# Patient Record
Sex: FEMALE | NOT HISPANIC OR LATINO | URBAN - METROPOLITAN AREA
[De-identification: names, ages, dates, MRNs, and addresses within clinical notes are randomized per-mention and may not be internally consistent; named-entity substitution may affect disease eponyms.]

---

## 2022-08-02 ENCOUNTER — TELEPHONE ENCOUNTER (OUTPATIENT)
Dept: URBAN - METROPOLITAN AREA CLINIC 70 | Facility: CLINIC | Age: 42
End: 2022-08-02

## 2022-08-03 ENCOUNTER — DASHBOARD ENCOUNTERS (OUTPATIENT)
Age: 42
End: 2022-08-03

## 2022-08-03 ENCOUNTER — OFFICE VISIT (OUTPATIENT)
Dept: URBAN - METROPOLITAN AREA TELEHEALTH 2 | Facility: TELEHEALTH | Age: 42
End: 2022-08-03
Payer: COMMERCIAL

## 2022-08-03 ENCOUNTER — OFFICE VISIT (OUTPATIENT)
Dept: URBAN - METROPOLITAN AREA TELEHEALTH 2 | Facility: TELEHEALTH | Age: 42
End: 2022-08-03

## 2022-08-03 ENCOUNTER — LAB OUTSIDE AN ENCOUNTER (OUTPATIENT)
Dept: URBAN - METROPOLITAN AREA TELEHEALTH 2 | Facility: TELEHEALTH | Age: 42
End: 2022-08-03

## 2022-08-03 VITALS — BODY MASS INDEX: 40.49 KG/M2 | WEIGHT: 258 LBS | HEIGHT: 67 IN

## 2022-08-03 DIAGNOSIS — R10.13 EPIGASTRIC ABDOMINAL PAIN: ICD-10-CM

## 2022-08-03 DIAGNOSIS — R11.0 NAUSEA: ICD-10-CM

## 2022-08-03 PROCEDURE — 99442 PHONE E/M BY PHYS 11-20 MIN: CPT | Performed by: INTERNAL MEDICINE

## 2022-08-03 PROCEDURE — 99203 OFFICE O/P NEW LOW 30 MIN: CPT | Performed by: INTERNAL MEDICINE

## 2022-08-03 RX ORDER — NEBIVOLOL HYDROCHLORIDE 10 MG/1
1 TABLET TABLET ORAL ONCE A DAY
Status: ACTIVE | COMMUNITY

## 2022-08-03 RX ORDER — LIOTHYRONINE SODIUM 5 UG/1
1 TABLET ON AN EMPTY STOMACH TABLET ORAL ONCE A DAY
Status: ACTIVE | COMMUNITY

## 2022-08-03 RX ORDER — OMEPRAZOLE 20 MG/1
1 CAPSULE 30 MINUTES BEFORE MORNING MEAL CAPSULE, DELAYED RELEASE ORAL ONCE A DAY
Status: ACTIVE | COMMUNITY

## 2022-08-03 RX ORDER — OMEPRAZOLE 20 MG/1
TAKE 1 CAPSULE CAPSULE, DELAYED RELEASE ORAL ONCE A DAY
Qty: 90 | Refills: 1

## 2022-08-03 NOTE — HPI-TODAY'S VISIT:
Referred by Liliana Perdomo NP for evaluation of epigastric abdominal pain.  A copy of this note will be sent to the referring provider.  States about one year ago experienced sudden onset of sharp, intense pain thast lasted several hours  before resolving.  Occurred again 1-2 months later and was evaluated by PCP.  Instructed to take OTC prilosec and follow up with GI.  Voices last episode as being in March 2022.  States pain can be very intense and "takes my breath away" that leads to cold sweats.  Her pain typically lasts for 3-40 minutes.  Denies any aggravating factors.  Nausea w/o vomiting occurs with her pain.  Currently takes OTC Prilosec as needed, like after eating spicy foods. Takes ibuprofen monthly during menses but denies signs of melena or rectal bleeding.    CT A/P w/o contrast on 06/24/2022:  small sliding hiatal hernia Labs 04/15/2022:  Normal CBC and CMP.